# Patient Record
Sex: FEMALE | Race: BLACK OR AFRICAN AMERICAN
[De-identification: names, ages, dates, MRNs, and addresses within clinical notes are randomized per-mention and may not be internally consistent; named-entity substitution may affect disease eponyms.]

---

## 2020-11-06 ENCOUNTER — HOSPITAL ENCOUNTER (OUTPATIENT)
Dept: HOSPITAL 88 - RAD | Age: 60
End: 2020-11-06
Attending: INTERNAL MEDICINE
Payer: COMMERCIAL

## 2020-11-06 DIAGNOSIS — M79.651: ICD-10-CM

## 2020-11-06 DIAGNOSIS — M25.551: Primary | ICD-10-CM

## 2020-11-06 NOTE — DIAGNOSTIC IMAGING REPORT
EXAMINATION:  HIP RIGHT 2-3 VW (+/- PELVIS), FEMUR TWO VIEW MINIMUM RIGHT    



INDICATION: Hip pain



COMPARISON: None

     

FINDINGS:



AP and frog-leg views of the right hip and AP, oblique and lateral views of the

right femur demonstrate no acute fracture or dislocation. Alignment is

anatomic. Mild degenerative changes of the right hip joint and of the right

knee with mild joint space narrowing and osteophyte formation. No substantial

knee joint effusion. The soft tissues appear unremarkable.



IMPRESSION: 

No acute osseous injury.



Signed by: Lucero Olson MD on 11/6/2020 3:29 PM